# Patient Record
Sex: MALE
[De-identification: names, ages, dates, MRNs, and addresses within clinical notes are randomized per-mention and may not be internally consistent; named-entity substitution may affect disease eponyms.]

---

## 2023-04-06 PROBLEM — Z00.00 ENCOUNTER FOR PREVENTIVE HEALTH EXAMINATION: Status: ACTIVE | Noted: 2023-04-06

## 2023-04-07 ENCOUNTER — APPOINTMENT (OUTPATIENT)
Dept: UROLOGY | Facility: CLINIC | Age: 73
End: 2023-04-07
Payer: MEDICARE

## 2023-04-07 VITALS
DIASTOLIC BLOOD PRESSURE: 89 MMHG | HEART RATE: 87 BPM | TEMPERATURE: 97.3 F | WEIGHT: 199 LBS | BODY MASS INDEX: 24.74 KG/M2 | OXYGEN SATURATION: 97 % | RESPIRATION RATE: 16 BRPM | HEIGHT: 75 IN | SYSTOLIC BLOOD PRESSURE: 137 MMHG

## 2023-04-07 DIAGNOSIS — R97.20 ELEVATED PROSTATE, SPECIFIC ANTIGEN [PSA]: ICD-10-CM

## 2023-04-07 PROCEDURE — 99203 OFFICE O/P NEW LOW 30 MIN: CPT

## 2023-04-07 NOTE — PHYSICAL EXAM
[General Appearance - Well Developed] : well developed [General Appearance - Well Nourished] : well nourished [Normal Appearance] : normal appearance [Well Groomed] : well groomed [General Appearance - In No Acute Distress] : no acute distress [Skin Color & Pigmentation] : normal skin color and pigmentation [No Focal Deficits] : no focal deficits [Oriented To Time, Place, And Person] : oriented to person, place, and time [Affect] : the affect was normal [Mood] : the mood was normal [Not Anxious] : not anxious [FreeTextEntry1] : On examination, the patient is a healthy-appearing gentleman in no acute distress. He is alert and oriented follows commands. He has normal mood and affect. Pt is circumcised. He has normal male external genitalia. Normal meatus. No fibrous plaque. No penile lesion noted and there is no discharge from the urethra. Right and Left Testicles are descended intrascrotally, normal on palpation, non tender and without masses or lesions. The scrotum is without induration, erythema, or edema. No hernias palpated in the inguinal canals. On rectal examination, there is normal sphincter tone. The prostate is of normal texture on the left side;  and slight firmness and asymmetry on the right side. The borders are discrete. The prostate size is 30 grams. No blood on the exam finger.

## 2023-04-07 NOTE — ADDENDUM
[FreeTextEntry1] : This note was authored by Adin Gary working as a scribe for Dr. Arcadio Hussein. I, Dr. Arcadio Hussein have reviewed the content of this note and confirm it is true and accurate. I personally performed the history and physical examination and made all the decisions. 04/07/2023

## 2023-04-11 ENCOUNTER — TRANSCRIPTION ENCOUNTER (OUTPATIENT)
Age: 73
End: 2023-04-11

## 2023-04-26 ENCOUNTER — APPOINTMENT (OUTPATIENT)
Dept: MRI IMAGING | Facility: CLINIC | Age: 73
End: 2023-04-26
Payer: MEDICARE

## 2023-04-26 ENCOUNTER — RESULT REVIEW (OUTPATIENT)
Age: 73
End: 2023-04-26

## 2023-04-26 ENCOUNTER — OUTPATIENT (OUTPATIENT)
Dept: OUTPATIENT SERVICES | Facility: HOSPITAL | Age: 73
LOS: 1 days | End: 2023-04-26
Payer: MEDICARE

## 2023-04-26 DIAGNOSIS — R97.20 ELEVATED PROSTATE SPECIFIC ANTIGEN [PSA]: ICD-10-CM

## 2023-04-26 PROCEDURE — A9585: CPT

## 2023-04-26 PROCEDURE — 72197 MRI PELVIS W/O & W/DYE: CPT

## 2023-04-26 PROCEDURE — 72197 MRI PELVIS W/O & W/DYE: CPT | Mod: 26,MH

## 2023-04-26 PROCEDURE — 76498 UNLISTED MR PROCEDURE: CPT

## 2023-04-26 PROCEDURE — 76498P: CUSTOM | Mod: 26,MH

## 2023-05-09 ENCOUNTER — NON-APPOINTMENT (OUTPATIENT)
Age: 73
End: 2023-05-09

## 2023-05-15 ENCOUNTER — APPOINTMENT (OUTPATIENT)
Dept: UROLOGY | Facility: CLINIC | Age: 73
End: 2023-05-15
Payer: MEDICARE

## 2023-05-15 ENCOUNTER — NON-APPOINTMENT (OUTPATIENT)
Age: 73
End: 2023-05-15

## 2023-05-15 ENCOUNTER — OUTPATIENT (OUTPATIENT)
Dept: OUTPATIENT SERVICES | Facility: HOSPITAL | Age: 73
LOS: 1 days | End: 2023-05-15
Payer: MEDICARE

## 2023-05-15 VITALS — SYSTOLIC BLOOD PRESSURE: 130 MMHG | HEART RATE: 110 BPM | DIASTOLIC BLOOD PRESSURE: 80 MMHG

## 2023-05-15 VITALS — HEART RATE: 106 BPM | DIASTOLIC BLOOD PRESSURE: 70 MMHG | SYSTOLIC BLOOD PRESSURE: 132 MMHG | OXYGEN SATURATION: 99 %

## 2023-05-15 DIAGNOSIS — R35.0 FREQUENCY OF MICTURITION: ICD-10-CM

## 2023-05-15 PROCEDURE — 76942 ECHO GUIDE FOR BIOPSY: CPT | Mod: 26,59

## 2023-05-15 PROCEDURE — 55700: CPT | Mod: 22

## 2023-05-15 PROCEDURE — 76942 ECHO GUIDE FOR BIOPSY: CPT | Mod: 59

## 2023-05-15 PROCEDURE — 76377 3D RENDER W/INTRP POSTPROCES: CPT | Mod: 26

## 2023-05-15 PROCEDURE — 55700: CPT

## 2023-05-16 DIAGNOSIS — C61 MALIGNANT NEOPLASM OF PROSTATE: ICD-10-CM

## 2023-06-09 ENCOUNTER — APPOINTMENT (OUTPATIENT)
Dept: UROLOGY | Facility: CLINIC | Age: 73
End: 2023-06-09
Payer: MEDICARE

## 2023-06-09 PROCEDURE — 99215 OFFICE O/P EST HI 40 MIN: CPT

## 2023-06-09 NOTE — ADDENDUM
[FreeTextEntry1] : This note was authored by Adin Gary working as a scribe for Dr. Arcadio Hussein. I, Dr. Arcadio Hussein have reviewed the content of this note and confirm it is true and accurate. I personally performed the history and physical examination and made all the decisions. 06/09/2023

## 2023-06-09 NOTE — DISEASE MANAGEMENT
[2] : T2 [a] : a [X] : MX [0-10] : 0 -10 ng/mL [Biopsy with Fusion] : Patient had a biopsy with fusion on [7(3+4)] : Fusion Biopsy [place] Score: 7(3+4) [Biopsy results sent to PCP/Referring Physician] : Biopsy results sent to PCP/Referring Physician [] : Patient had a Prostate MRI [4] : 4 [IIB] : IIB [BiopsyDate] : 5/2023 [MeasuredProstateVolume] : 55 [TotalCores] : 14 [TotalPositiveCores] : 6 [MaxCoreInvolvement] : 60

## 2023-06-09 NOTE — HISTORY OF PRESENT ILLNESS
[FreeTextEntry1] : 04/07/2023: Mr. SPATZ is a 73 year old male presenting today for an Elevated PSA consult. He is a retired . He reports that his PSA has been gradually increasing and the latest PSA screen is 4.6 ng/mL.  He reports Nocturia x1. He denies fhx of prostate cancer. He denies difficulties with urination. His AUA score today is 7/35.\par On examination:\par The patient is a healthy-appearing gentleman in no acute distress. He is alert and oriented follows commands. He has normal mood and affect. Pt is circumcised. He has normal male external genitalia. Normal meatus. No fibrous plaque. No penile lesion noted and there is no discharge from the urethra. Right and Left Testicles are descended intrascrotally, normal on palpation, non tender and without masses or lesions. The scrotum is without induration, erythema, or edema. No hernias palpated in the inguinal canals. On rectal examination, there is normal sphincter tone. The prostate is of normal texture on the left side;  and slight firmness and asymmetry on the right side. The borders are discrete. The prostate size is 30 grams. No blood on the exam finger.\par ASSESSMENT: Increasing PSA Velocity; slight firmness and asymmetry on the right side of the prostate on MACIEL\par PLAN: Pt will have a Prostate MRI. I have discussed at length the risks and benefits and rationale behind the procedure and the patient seems to understand and wants to proceed. Follow up to discuss results. \par \par 06/09/2023: Mr. SPATZ is a 73 year old male presenting today for consideration of curative therapy for prostate cancer. He denies fhx of prostate cancer. He expresses interest in  a consult with our radiation oncology team. \par \par -PSA: 4.61 ng/mL\par -Clinical Stage T2a\par -Prostate Biopsy on 05/14/2023  shows [place] 7 (4+3); 6/14 positive cores. Two of the positive cores were Bradley 7. \par 1. Prostate, right posterior medial apex. Adenocarcinoma of the prostate, Prognostic Grade Group 2 ([place] score 3+4=7) involving 60% (7 mm in length) of 1 core. Bradley pattern 4 comprises nearly 50% of tumor). \par 4. Prostate, right posterior lateral base. Adenocarcinoma of the prostate, Prognostic Grade Group 2 ([place]\par  score 3+4=7) involving 60% (9 mm in length) of 1 core. [place] pattern 4 comprises less than 10% of tumor. Perineural invasion is identified.\par - Prostate MRI on 04/26/2023 shows two PIRADS 4  lesions - High (clinically significant cancer is likely to be present). Lesion #1 Left, posterior medial (PZpm), base-to-midgland, peripheral zone; and Lesion #2 Right, posterior medial (PZpm), base-to-midgland, peripheral zone. Prostate Volume: 55 mL PSA density: 0.08 ng/mL/mL.Neurovascular bundle: No evidence of neurovascular bundle invasion. Seminal vesicles: No seminal vesicle invasion.Lymph nodes: No pelvic adenopathy. Bones: No suspicious lesions identified.\par Urinary bladder: Wall thickening/trabeculation likely related to chronic outlet obstruction.\par Other: Sigmoid diverticulosis. Enhancement involving the left hip with synovial thickening inferiorly consistent with synovitis."\par \par ASSESSMENT: cT2b Cap, \par \par Today we discussed the natural history of localized prostate cancer, and the heterogeneous biology of this malignancy.  We discussed the fact that many prostate cancers are slow growing and unlikely to metastasize or cause death, whereas others can be life threatening.  We reviewed risk stratification, staging, Canova scoring, and PSA levels as they pertain to risk.  \par \par All treatment options were reviewed. This included active surveillance, androgen deprivation, emerging options such as focal therapy/HIFU/cryotherapy, radiation options (including IMRT, SBRT, brachytherapy) and surgical options ( open vs. robotic, nerve vs non-nerve sparing.) Oncologic outcomes were compared and contrasted. Risks of biochemical and clinical recurrence discussed. Risks of needing adjuvant or salvage treatment reviewed. We discussed the the risks of secondary malignancy after radiation. We discussed the opportunity for pathological staging with survey vs other options. We discussed the possibility of positive margins, treatment failure, cancer recurrence and cancer-related death even with treatment. \par \par All potential side effects of treatment were reviewed including, but not limited to: short term or permanent stress urinary incontinence and/or short term or permanent erectile dysfunction, penile shortening, rectal symptoms/pain, perineal pain, and other side effects. \par \par All potential complications of treatment and surgery were reviewed including, but not limited to: risks of conversion from MIS to open surgery discussed, bleeding/life-threatening hemorrhage, rectal injury requiring colostomy or delayed recognition leading to fistula, vascular/bowel/adjacent visceral organ injury, trocar/access injury, the possibility of recognized vs. unrecognized/delayed- recognition injury, risks of thermal/blunt/sharp/retraction injury, risks of DVT, PE, MI, death risks of cardiopulmonary/anesthesia related complications, positional injury, infection/collection/abscess, wound complications/dehiscence/seroma/cellulitis, urinoma/fistula, uretal injury/obstruction, bladder neck contracture, penile shortening, meatal stenosis, urethral stricture, lymphocele, obstructor nerve injury, prolonged anastomotic leak, and other complications.\par \par PLAN: Consult sent to Radiation Oncologist, DR Taco Granados. \par \par I counseled the patient. I discussed the various etiologies of his symptoms. Risks and alternatives were discussed. I answered the patient questions. The patient will follow-up as directed and will contact me with any questions or concerns. Thank you for the opportunity to participate in the care of this patient. I'll keep you updated on his progress.

## 2023-06-21 NOTE — HISTORY OF PRESENT ILLNESS
You have severe mitral valve regurgitation with prolapse with torn chordae  Dr Solano recommends mitral valve repair/replacement- tissue and left atrial appendage clipping, possible myocardial revascularization with endoscopic vein harvest (pending cath results)    Average stay 3-5 days  No lifting, pushing or pulling more than 10 lbs until seen post operatively than no more than 30 lbs for the first 8 weeks after surgery  NO driving for 2 weeks after discharge and under influence of narcotics     You will need a heart catheterization prior to surgery to assess coronaries  Standard preoperative testing to be completed following heart cath  Continue Aspirin until day of surgery  Hold supplements within 2 weeks of surgery       [FreeTextEntry1] : 04/07/2023: Mr. SPATZ is a 73 year old male presenting today for an Elevated PSA consult. He is a retired . He reports that his PSA has been gradually increasing and the lastest PSA screen is 4.6 ng/mL.  He reports Nocturia x1. He denies fhx of prostate cancer. He denies difficulties with urination. His AUA score today is 7/35.\par \par On examination, the patient is a healthy-appearing gentleman in no acute distress. He is alert and oriented follows commands. He has normal mood and affect. Pt is circumcised. He has normal male external genitalia. Normal meatus. No fibrous plaque. No penile lesion noted and there is no discharge from the urethra. Right and Left Testicles are descended intrascrotally, normal on palpation, non tender and without masses or lesions. The scrotum is without induration, erythema, or edema. No hernias palpated in the inguinal canals. On rectal examination, there is normal sphincter tone. The prostate is of normal texture on the left side;  and slight firmness and asymmetry on the right side. The borders are discrete. The prostate size is 30 grams. No blood on the exam finger.\par \par ASSESSMENT: Increasing PSA Velocity; slight firmness and asymmetry on the right side of the prostate on MACIEL\par \par PLAN: Pt will have a Prostate MRI. I have discussed at length the risks and benefits and rationale behind the procedure and the patient seems to understand and wants to proceed. Follow up to discuss results. \par \par I counseled the patient. I discussed the various etiologies of his symptoms. Risks and alternatives were discussed. I answered the patient questions. The patient will follow-up as directed and will contact me with any questions or concerns. Thank you for the opportunity to participate in the care of this patient. I'll keep you updated on his progress.

## 2023-07-05 ENCOUNTER — OUTPATIENT (OUTPATIENT)
Dept: OUTPATIENT SERVICES | Facility: HOSPITAL | Age: 73
LOS: 1 days | Discharge: HOME | End: 2023-07-05

## 2023-07-07 ENCOUNTER — APPOINTMENT (OUTPATIENT)
Dept: RADIATION ONCOLOGY | Facility: CLINIC | Age: 73
End: 2023-07-07
Payer: MEDICARE

## 2023-07-07 VITALS
DIASTOLIC BLOOD PRESSURE: 78 MMHG | OXYGEN SATURATION: 97 % | SYSTOLIC BLOOD PRESSURE: 131 MMHG | TEMPERATURE: 97.16 F | HEART RATE: 76 BPM | WEIGHT: 206.46 LBS | RESPIRATION RATE: 17 BRPM | HEIGHT: 75 IN | BODY MASS INDEX: 25.67 KG/M2

## 2023-07-07 DIAGNOSIS — Z87.438 PERSONAL HISTORY OF OTHER DISEASES OF MALE GENITAL ORGANS: ICD-10-CM

## 2023-07-07 DIAGNOSIS — Z86.79 PERSONAL HISTORY OF OTHER DISEASES OF THE CIRCULATORY SYSTEM: ICD-10-CM

## 2023-07-07 DIAGNOSIS — C61 MALIGNANT NEOPLASM OF PROSTATE: ICD-10-CM

## 2023-07-07 DIAGNOSIS — Z87.39 PERSONAL HISTORY OF OTHER DISEASES OF THE MUSCULOSKELETAL SYSTEM AND CONNECTIVE TISSUE: ICD-10-CM

## 2023-07-07 PROCEDURE — 99205 OFFICE O/P NEW HI 60 MIN: CPT | Mod: 25,GC

## 2023-07-07 RX ORDER — CANDESARTAN CILEXETIL 32 MG/1
32 TABLET ORAL
Refills: 0 | Status: ACTIVE | COMMUNITY

## 2023-07-08 LAB — PSA SERPL-MCNC: 4.23 NG/ML

## 2023-07-09 PROBLEM — C61 ADENOCARCINOMA OF PROSTATE: Status: ACTIVE | Noted: 2023-07-07

## 2023-07-09 NOTE — HISTORY OF PRESENT ILLNESS
[FreeTextEntry1] : Mr. William Spatz is a 72 yo M  with a cT2a G7 (3+4) prostate cancer, PSA 4.6 \par \par Prostate MRI on 04/26/2023 shows 55cc gland. two PIRADS 4 lesions - Lesion #1 Left, posterior medial (PZpm), base-to-midgland, peripheral zone; and Lesion #2 Right, posterior medial (PZpm), base-to-midgland, peripheral zone.  No evidence of neurovascular bundle invasion. No SVI, pelvic LAD or bone met. \par \par Prostate Biopsy on 05/14/2023 shows Bradley 7 (3+4); 6/14 positive cores. Two of the positive cores were Bradley 7. \par 1. Prostate, right posterior medial apex. Adenocarcinoma of the prostate, Prognostic Grade Group 2 ([place] score 3+4=7) involving 60% (7 mm in length) of 1 core. [place] pattern 4 comprises nearly 50% of tumor). \par 4. Prostate, right posterior lateral base. Adenocarcinoma of the prostate, Prognostic Grade Group 2 ([place]\par  score 3+4=7) involving 60% (9 mm in length) of 1 core. Bradley pattern 4 comprises less than 10% of tumor. Perineural invasion is identified.\par \par 7/7/23 assessment:\par  he denies major urinary issues. He reports nocturia urinary urgency, frequency is once or twice at night. Denies daytime issues. His wife has multiple myeloma and is receiving chemotherapy.  \par Last Colonoscopy; 12/22/2021\par IPSS 4/7/23 7 (mild) QOL mostly satisfied

## 2023-07-09 NOTE — PHYSICAL EXAM
[General Appearance - Well Developed] : well developed [Sclera] : the sclera and conjunctiva were normal [General Appearance - In No Acute Distress] : in no acute distress [Extraocular Movements] : extraocular movements were intact [Outer Ear] : the ears and nose were normal in appearance [] : no respiratory distress [Hearing Threshold Finger Rub Not [place]] : hearing was normal [Respiration, Rhythm And Depth] : normal respiratory rhythm and effort [Exaggerated Use Of Accessory Muscles For Inspiration] : no accessory muscle use [Abdomen Soft] : soft [Arterial Pulses Normal] : the arterial pulses were normal [Musculoskeletal - Swelling] : no joint swelling [Nail Clubbing] : no clubbing  or cyanosis of the fingernails [Skin Color & Pigmentation] : normal skin color and pigmentation [No Focal Deficits] : no focal deficits [Motor Exam] : the motor exam was normal [Affect] : the affect was normal [Oriented To Time, Place, And Person] : oriented to person, place, and time [General Appearance - Well Nourished] : well nourished [General Appearance - Alert] : alert [Edema] : no peripheral edema present [Nondistended] : nondistended [Range of Motion to Joints] : range of motion to joints [Motor Tone] : muscle strength and tone were normal [Not Anxious] : not anxious

## 2023-07-09 NOTE — DISEASE MANAGEMENT
[2] : T2 [X] : MX [0-10] : 0 -10 ng/mL [Biopsy with Fusion] : Patient had a biopsy with fusion on [7(3+4)] : Fusion Biopsy [place] Score: 7(3+4) [] : Patient had a Prostate MRI [4] : 4 [IIA] : IIA [BiopsyDate] : 05/23 [MeasuredProstateVolume] : 55 [TotalCores] : 14 [TotalPositiveCores] : 6 [MaxCoreInvolvement] : 60

## 2023-07-09 NOTE — VITALS
[Maximal Pain Intensity: 0/10] : 0/10 [90: Able to carry normal activity; minor signs or symptoms of disease.] : 90: Able to carry normal activity; minor signs or symptoms of disease.  [Date: ____________] : Patient's last distress assessment performed on [unfilled]. [0 - No Distress] : Distress Level: 0 [ECOG Performance Status: 0 - Fully active, able to carry on all pre-disease performance without restriction] : Performance Status: 0 - Fully active, able to carry on all pre-disease performance without restriction

## 2023-07-09 NOTE — END OF VISIT
[Time Spent: ___ minutes] : I have spent [unfilled] minutes of time on the encounter. [FreeTextEntry3] : I saw and examined this patient on the date of service with my assigned resident physician, Dr. Bowden. I was involved in all procedures and laboratory/radiographic assessments. I personally confirmed pertinent history and exam findings and reviewed the patient's diagnosis and plan with them. I have reviewed and edited the resident note and agree with their overall plan. Additional comments below.\par \par 73M with prostate cancer; favorable intermediate risk cT2a G7(3+4) iPSA 4.61. MR prostate 4/26/23 showed 5.2 x 4.5 x 4.5 cm = 55 cc gland, with dominant lesions in left posterior medial (PZpm), base-to-midgland peripheral zone (OR-4, 1.1 cm) and right posterior medial (PZpm), base-to-midgland peripheral zone (OR-4, 0.5 cm, with capsule irregularity); no clear gross BO, SVI, or LAD. BPH but no enlarged median lobe or urethral deviation. Prostate biopsy 5/14/23 showed up to G7(3+4) disease in the right posterior medial apex (1/1, 60%) and right posterior lateral base (1/1, 60%, +PNI); G6(3+3) in target lesions, left posterior medial base, and right posterior lateral apex; 6/14 sites involved.  \par \par We had an extensive discussion of the patient's imaging (MRI), labs, and prostate biopsy, and reviewed them together; I independently evaluated these and discussed their significance with the patient. I have also been involved in the multidisciplinary discussion of his care with his other providers. We discussed the natural history of prostate cancer and its management. \par \par He has favorable intermediate risk prostate cancer. Surveillance is a reasonable option, although given his overall good health and the palpable lesion against the capsule visualized on MRI, I would favor a definitive therapy, and I discussed options including surgery, brachytherapy, and external beam radiation therapy. Given his risk group, I do not feel there is any need for androgen deprivation therapy. We reviewed the risks, benefits, and possible acute and long term adverse effects of these approaches; given the information available, for radiation approaches I would favor SBRT, but brachytherapy is also an option. \par \par We will recheck labs today and he will consider his options; he doesn't want to proceed with anything right away as his wife is finishing off systemic therapy, which I think is reasonable. He has our contact info and can reach out to us for any questions in the interim.

## 2023-07-10 ENCOUNTER — TRANSCRIPTION ENCOUNTER (OUTPATIENT)
Age: 73
End: 2023-07-10

## 2023-09-12 LAB — PROSTATE BIOPSY: NORMAL

## 2024-07-08 ENCOUNTER — RX ONLY (RX ONLY)
Age: 74
End: 2024-07-08

## 2024-07-08 ENCOUNTER — OFFICE (OUTPATIENT)
Dept: URBAN - METROPOLITAN AREA CLINIC 102 | Facility: CLINIC | Age: 74
Setting detail: OPHTHALMOLOGY
End: 2024-07-08
Payer: MEDICARE

## 2024-07-08 DIAGNOSIS — H52.4: ICD-10-CM

## 2024-07-08 DIAGNOSIS — H25.13: ICD-10-CM

## 2024-07-08 DIAGNOSIS — H40.013: ICD-10-CM

## 2024-07-08 PROBLEM — H52.7 REFRACTIVE ERROR: Status: ACTIVE | Noted: 2024-07-08

## 2024-07-08 PROCEDURE — 92015 DETERMINE REFRACTIVE STATE: CPT | Performed by: OPHTHALMOLOGY

## 2024-07-08 PROCEDURE — 92020 GONIOSCOPY: CPT | Performed by: OPHTHALMOLOGY

## 2024-07-08 PROCEDURE — 99204 OFFICE O/P NEW MOD 45 MIN: CPT | Performed by: OPHTHALMOLOGY

## 2024-07-08 ASSESSMENT — CONFRONTATIONAL VISUAL FIELD TEST (CVF)
OD_FINDINGS: FULL
OS_FINDINGS: FULL

## 2024-10-15 ENCOUNTER — OFFICE (OUTPATIENT)
Dept: URBAN - METROPOLITAN AREA CLINIC 102 | Facility: CLINIC | Age: 74
Setting detail: OPHTHALMOLOGY
End: 2024-10-15
Payer: MEDICARE

## 2024-10-15 DIAGNOSIS — H40.013: ICD-10-CM

## 2024-10-15 DIAGNOSIS — H25.13: ICD-10-CM

## 2024-10-15 PROCEDURE — 92083 EXTENDED VISUAL FIELD XM: CPT | Performed by: OPHTHALMOLOGY

## 2024-10-15 PROCEDURE — 92250 FUNDUS PHOTOGRAPHY W/I&R: CPT | Performed by: OPHTHALMOLOGY

## 2024-10-15 PROCEDURE — 99213 OFFICE O/P EST LOW 20 MIN: CPT | Performed by: OPHTHALMOLOGY

## 2024-10-15 ASSESSMENT — REFRACTION_AUTOREFRACTION
OD_SPHERE: -4.25
OS_SPHERE: -3.50
OD_AXIS: 87
OS_AXIS: 87
OS_CYLINDER: -0.75
OD_CYLINDER: -1.00

## 2024-10-15 ASSESSMENT — REFRACTION_MANIFEST
OD_CYLINDER: -1.25
OD_VA1: 20/20
OD_ADD: +2.50
OD_VA1: 20/25+
OD_SPHERE: -4.75
OS_VA1: 20/20
OD_SPHERE: -5.25
OS_SPHERE: -4.00
OD_CYLINDER: -1.00
OS_VA1: 20/20
OD_AXIS: 090
OS_CYLINDER: -0.75
OS_AXIS: 85
OS_SPHERE: -5.00
OS_ADD: +2.50
OS_CYLINDER: -0.50
OD_AXIS: 95
OS_AXIS: 090

## 2024-10-15 ASSESSMENT — PACHYMETRY
OS_CT_CORRECTION: -1
OS_CT_UM: 552
OD_CT_UM: 542
OD_CT_CORRECTION: 0

## 2024-10-15 ASSESSMENT — REFRACTION_CURRENTRX
OS_AXIS: 086
OD_AXIS: 100
OS_VPRISM_DIRECTION: PROGS
OS_SPHERE: -4.25
OD_SPHERE: -4.75
OD_OVR_VA: 20/
OS_OVR_VA: 20/
OS_ADD: +2.50
OD_CYLINDER: -1.25
OD_VPRISM_DIRECTION: PROGS
OD_ADD: +2.50
OS_CYLINDER: -1.50

## 2024-10-15 ASSESSMENT — CONFRONTATIONAL VISUAL FIELD TEST (CVF)
OS_FINDINGS: FULL
OD_FINDINGS: FULL

## 2024-10-15 ASSESSMENT — VISUAL ACUITY
OS_BCVA: 20/30-2
OD_BCVA: 20/25

## 2024-10-15 ASSESSMENT — TONOMETRY
OD_IOP_MMHG: 16
OD_IOP_MMHG: 13
OS_IOP_MMHG: 15
OS_IOP_MMHG: 12

## 2024-10-15 ASSESSMENT — KERATOMETRY
METHOD_AUTO_MANUAL: AUTO
OD_K2POWER_DIOPTERS: 44.00
OD_K1POWER_DIOPTERS: 42.75
OD_AXISANGLE_DEGREES: 008
OS_AXISANGLE_DEGREES: 169
OS_K2POWER_DIOPTERS: 44.00
OS_K1POWER_DIOPTERS: 42.75

## 2025-04-18 ENCOUNTER — OFFICE (OUTPATIENT)
Dept: URBAN - METROPOLITAN AREA CLINIC 102 | Facility: CLINIC | Age: 75
Setting detail: OPHTHALMOLOGY
End: 2025-04-18
Payer: MEDICARE

## 2025-04-18 DIAGNOSIS — H40.013: ICD-10-CM

## 2025-04-18 DIAGNOSIS — H25.13: ICD-10-CM

## 2025-04-18 PROCEDURE — 92014 COMPRE OPH EXAM EST PT 1/>: CPT | Performed by: OPHTHALMOLOGY

## 2025-04-18 ASSESSMENT — REFRACTION_MANIFEST
OD_AXIS: 090
OS_CYLINDER: -0.50
OS_ADD: +2.50
OS_CYLINDER: -0.75
OS_AXIS: 85
OS_SPHERE: -4.00
OD_SPHERE: -4.75
OD_VA1: 20/20
OD_SPHERE: -5.25
OS_AXIS: 090
OD_VA1: 20/25+
OD_CYLINDER: -1.00
OD_ADD: +2.50
OD_CYLINDER: -1.25
OS_VA1: 20/20
OS_SPHERE: -5.00
OD_AXIS: 95
OS_VA1: 20/20

## 2025-04-18 ASSESSMENT — CONFRONTATIONAL VISUAL FIELD TEST (CVF)
OD_FINDINGS: FULL
OS_FINDINGS: FULL

## 2025-04-18 ASSESSMENT — REFRACTION_CURRENTRX
OD_ADD: +2.50
OS_OVR_VA: 20/
OD_CYLINDER: -1.25
OS_VPRISM_DIRECTION: PROGS
OS_AXIS: 086
OD_VPRISM_DIRECTION: PROGS
OS_SPHERE: -4.25
OS_CYLINDER: -1.50
OD_OVR_VA: 20/
OD_SPHERE: -4.75
OD_AXIS: 100
OS_ADD: +2.50

## 2025-04-18 ASSESSMENT — REFRACTION_AUTOREFRACTION
OD_SPHERE: -4.75
OS_CYLINDER: -0.75
OD_CYLINDER: -1.25
OS_SPHERE: -4.00
OS_AXIS: 088
OD_AXIS: 091

## 2025-04-18 ASSESSMENT — KERATOMETRY
OS_K2POWER_DIOPTERS: 43.75
OS_K1POWER_DIOPTERS: 42.75
OD_K2POWER_DIOPTERS: 44.25
OS_AXISANGLE_DEGREES: 172
OD_K1POWER_DIOPTERS: 43.00
OD_AXISANGLE_DEGREES: 014
METHOD_AUTO_MANUAL: AUTO

## 2025-04-18 ASSESSMENT — PACHYMETRY
OS_CT_UM: 552
OD_CT_UM: 542
OD_CT_CORRECTION: 0
OS_CT_CORRECTION: -1

## 2025-04-18 ASSESSMENT — VISUAL ACUITY
OD_BCVA: 20/25
OS_BCVA: 20/25

## 2025-04-18 ASSESSMENT — TONOMETRY
OS_IOP_MMHG: 16
OD_IOP_MMHG: 19

## 2025-05-30 ENCOUNTER — OFFICE (OUTPATIENT)
Dept: URBAN - METROPOLITAN AREA CLINIC 102 | Facility: CLINIC | Age: 75
Setting detail: OPHTHALMOLOGY
End: 2025-05-30
Payer: MEDICARE

## 2025-05-30 DIAGNOSIS — H40.013: ICD-10-CM

## 2025-05-30 PROCEDURE — 99213 OFFICE O/P EST LOW 20 MIN: CPT | Performed by: OPHTHALMOLOGY

## 2025-05-30 ASSESSMENT — REFRACTION_MANIFEST
OS_VA1: 20/20
OS_ADD: +2.50
OS_SPHERE: -5.00
OS_CYLINDER: -0.50
OD_CYLINDER: -1.25
OD_VA1: 20/20
OS_VA1: 20/20
OD_CYLINDER: -1.00
OS_AXIS: 85
OD_AXIS: 090
OD_SPHERE: -5.25
OD_ADD: +2.50
OD_VA1: 20/25+
OS_SPHERE: -4.00
OS_CYLINDER: -0.75
OS_AXIS: 090
OD_AXIS: 95
OD_SPHERE: -4.75

## 2025-05-30 ASSESSMENT — KERATOMETRY
OD_K2POWER_DIOPTERS: 44.25
METHOD_AUTO_MANUAL: AUTO
OD_AXISANGLE_DEGREES: 006
OS_AXISANGLE_DEGREES: 173
OS_K1POWER_DIOPTERS: 42.75
OS_K2POWER_DIOPTERS: 44.00
OD_K1POWER_DIOPTERS: 42.50

## 2025-05-30 ASSESSMENT — REFRACTION_AUTOREFRACTION
OS_CYLINDER: -1.00
OS_SPHERE: -3.50
OD_SPHERE: -4.00
OD_CYLINDER: -1.75
OD_AXIS: 089
OS_AXIS: 090

## 2025-05-30 ASSESSMENT — REFRACTION_CURRENTRX
OS_ADD: +2.50
OS_AXIS: 086
OD_SPHERE: -4.75
OD_ADD: +2.50
OS_SPHERE: -4.25
OD_VPRISM_DIRECTION: PROGS
OS_CYLINDER: -1.50
OD_OVR_VA: 20/
OS_OVR_VA: 20/
OD_CYLINDER: -1.25
OD_AXIS: 100
OS_VPRISM_DIRECTION: PROGS

## 2025-05-30 ASSESSMENT — PACHYMETRY
OS_CT_CORRECTION: -1
OD_CT_CORRECTION: 0
OS_CT_UM: 552
OD_CT_UM: 542

## 2025-05-30 ASSESSMENT — TONOMETRY
OD_IOP_MMHG: 17
OS_IOP_MMHG: 14
OS_IOP_MMHG: 15
OD_IOP_MMHG: 15

## 2025-05-30 ASSESSMENT — VISUAL ACUITY
OD_BCVA: 20/25+1
OS_BCVA: 20/25+2

## 2025-05-30 ASSESSMENT — CONFRONTATIONAL VISUAL FIELD TEST (CVF)
OD_FINDINGS: FULL
OS_FINDINGS: FULL